# Patient Record
Sex: MALE
[De-identification: names, ages, dates, MRNs, and addresses within clinical notes are randomized per-mention and may not be internally consistent; named-entity substitution may affect disease eponyms.]

---

## 2023-05-08 PROBLEM — Z00.00 ENCOUNTER FOR PREVENTIVE HEALTH EXAMINATION: Status: ACTIVE | Noted: 2023-05-08

## 2023-05-10 ENCOUNTER — APPOINTMENT (OUTPATIENT)
Dept: CARDIOLOGY | Facility: CLINIC | Age: 33
End: 2023-05-10
Payer: COMMERCIAL

## 2023-05-10 VITALS
DIASTOLIC BLOOD PRESSURE: 72 MMHG | OXYGEN SATURATION: 98 % | RESPIRATION RATE: 16 BRPM | BODY MASS INDEX: 28.23 KG/M2 | SYSTOLIC BLOOD PRESSURE: 120 MMHG | HEIGHT: 78 IN | WEIGHT: 244 LBS | HEART RATE: 86 BPM

## 2023-05-10 DIAGNOSIS — Z82.49 FAMILY HISTORY OF ISCHEMIC HEART DISEASE AND OTHER DISEASES OF THE CIRCULATORY SYSTEM: ICD-10-CM

## 2023-05-10 DIAGNOSIS — R06.00 DYSPNEA, UNSPECIFIED: ICD-10-CM

## 2023-05-10 DIAGNOSIS — Z78.9 OTHER SPECIFIED HEALTH STATUS: ICD-10-CM

## 2023-05-10 DIAGNOSIS — E78.49 OTHER HYPERLIPIDEMIA: ICD-10-CM

## 2023-05-10 PROCEDURE — 99204 OFFICE O/P NEW MOD 45 MIN: CPT | Mod: 25

## 2023-05-10 PROCEDURE — 93000 ELECTROCARDIOGRAM COMPLETE: CPT
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
## 2023-05-10 NOTE — PHYSICAL EXAM
[de-identified] :                    Well appearing and nourished with no obvious deformities or distress.\par \par Eyes: \par No conjunctival injection and no xanthelasmas.\par HEENT: \par Normocephalic.Normal oral mucosa. No pallor or cyanosis\par Neck: \par No jugular venous distension. with normal A and V wave forms. No palpable adenopathy.\par Cardiovascular: \par Normal rate and rhythm with normal S1, S2 and a grade 1/6 systolic murmur. Distal arterial pulses are normal. No significant peripheral edema.\par Pulmonary: \par Lungs are clear to auscultation and percussion. Normal respiratory pattern without any accessory muscle use\par Abdomen: \par Soft, non-tender ; no palpable organomegaly or masses.\par Extremities:\par No digital clubbing, cyanosis or ischemic changes.\par Skin: \par No skin lesions, rashes, ulcers or xanthomas.\par Psychiatric: \par Alert and oriented to person, place and time. Appropriate mood and affect.
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
## 2023-05-20 ENCOUNTER — APPOINTMENT (OUTPATIENT)
Dept: CARDIOLOGY | Facility: CLINIC | Age: 33
End: 2023-05-20
Payer: COMMERCIAL

## 2023-05-20 PROCEDURE — 93306 TTE W/DOPPLER COMPLETE: CPT
